# Patient Record
Sex: MALE | Race: WHITE
[De-identification: names, ages, dates, MRNs, and addresses within clinical notes are randomized per-mention and may not be internally consistent; named-entity substitution may affect disease eponyms.]

---

## 2019-06-09 ENCOUNTER — HOSPITAL ENCOUNTER (EMERGENCY)
Dept: HOSPITAL 41 - JD.ED | Age: 49
Discharge: HOME | End: 2019-06-09
Payer: COMMERCIAL

## 2019-06-09 DIAGNOSIS — Z88.8: ICD-10-CM

## 2019-06-09 DIAGNOSIS — Z79.899: ICD-10-CM

## 2019-06-09 DIAGNOSIS — I10: ICD-10-CM

## 2019-06-09 DIAGNOSIS — E78.00: ICD-10-CM

## 2019-06-09 DIAGNOSIS — K21.9: ICD-10-CM

## 2019-06-09 DIAGNOSIS — H10.9: Primary | ICD-10-CM

## 2019-06-09 DIAGNOSIS — B96.89: ICD-10-CM

## 2019-06-09 NOTE — EDM.PDOC
ED HPI GENERAL MEDICAL PROBLEM





- General


Chief Complaint: Eye Problems


Stated Complaint: EYE IRRITATION,POSS SOMETHING IN EYE


Time Seen by Provider: 06/09/19 13:00


Source of Information: Reports: Patient, Family


History Limitations: Reports: No Limitations





- History of Present Illness


INITIAL COMMENTS - FREE TEXT/NARRATIVE: 


48-year-old male presents to the ED with a diffusely erythematous left 

conjunctiva for the last week. States his daughter had pinkeye about 3 weeks 

ago and got better after 4 days of they believe Cipro ophthalmic drops. He's 

been using these the last 2 days without any relief. The eye is crusted and 

stuck shut in the morning and he has to wipe away mucus several times during 

the day as it blurs his visual field in the left eye. He feels a foreign body 

sensation in the eye particularly up underneath his left eyelid. Symptoms 

started about a week ago. No associated upper respiratory tract infection. 

States the eyes not excessively tearful. Does not excessively photosensitive to 

light but is mildly so. He ascribes the discomfort as a bit of a burning 

discomfort versus an itch.





Onset: Gradual


Onset Date: 06/02/19


Duration: Day(s):, Constant, Getting Worse


Location: Reports: Face


Quality: Reports: Ache (Left, high involving the conjunctiva.), Burning, Other (

Diffuse erythema with exudate right eye for a week)


Severity: Moderate


Improves with: Reports: None


Worsens with: Reports: None


Context: Reports: Sick Contact (Possibly. His daughter had pinkeye 3 weeks ago.)

.  Denies: Activity, Exercise, Lifting, Trauma, Other


Associated Symptoms: Denies: No Other Symptoms, Confusion, Chest Pain, Cough, 

cough w sputum, Diaphoresis, Fever/Chills, Headaches, Loss of Appetite, Malaise

, Nausea/Vomiting, Shortness of Breath, Syncope


Treatments PTA: Reports: Other (see below) (None.)





- Related Data


 Allergies











Allergy/AdvReac Type Severity Reaction Status Date / Time


 


ragweed pollen Allergy  Cannot Verified 06/09/19 11:44





   Remember  











Home Meds: 


 Home Meds





Gentamicin [Garamycin 0.3% Ophth Soln] 5 ml EYEBOTH TID #1 bottle 06/09/19 [Rx]


Lisinopril 20 mg PO DAILY 06/09/19 [History]


Meloxicam 15 mg PO DAILY 06/09/19 [History]


Metoprolol Tartrate 50 mg PO DAILY 06/09/19 [History]


Metoprolol Tartrate 100 mg PO BEDTIME 06/09/19 [History]


Omeprazole 40 mg PO DAILY 06/09/19 [History]











Past Medical History


Cardiovascular History: Reports: High Cholesterol, Hypertension


Gastrointestinal History: Reports: GERD


Musculoskeletal History: Reports: Arthritis





Social & Family History





- Tobacco Use


Smoking Status *Q: Never Smoker





- Caffeine Use


Caffeine Use: Reports: Coffee, Soda





- Recreational Drug Use


Recreational Drug Use: No





- Living Situation & Occupation


Living situation: Reports: 


Occupation: Employed





ED ROS ENT





- Review of Systems


Review Of Systems: See Below


Constitutional: Denies: Fever, Chills, Malaise, Weakness, Fatigue, Decreased 

Appetite, Weight Loss


HEENT: Reports: Eye Discharge (Left), Eye Pain.  Denies: Contact Lenses, Glasses


Respiratory: Reports: No Symptoms ( left for the last week)


Cardiovascular: Reports: No Symptoms


Endocrine: Reports: No Symptoms


GI/Abdominal: Reports: No Symptoms


: Reports: No Symptoms


Musculoskeletal: Reports: Other


Skin: Reports: No Symptoms (Has arthritis.)


Neurological: Reports: No Symptoms


Psychiatric: Reports: No Symptoms





ED EXAM, ENT





- Physical Exam


Exam: See Below


Exam Limited By: No Limitations


General Appearance: Alert, WD/WN, No Apparent Distress, Other (Left eye is 

obviously very erythematous i.e. the conjunctivae severely injected.)


Eye Exam: Left Eye: Conjunctival Injection (Severe. Nothing on the right side), 

Normal Fundi, Bilateral Eye: Corneal Abrasion (Slit-lamp examination reveals no 

corneal abrasion on the left side), Periorbital Changes (No periorbital changes.

), PERRL, Other (Does have some mucopurulent discharge medial canthus of the 

eye yellowish in color.)


Ears: Normal TMs


Nose: Normal Inspection


Mouth/Throat: Normal Inspection, Normal Gums, Normal Teeth


Head: Atraumatic, Normocephalic





Course





- Vital Signs


Last Recorded V/S: 


 Last Vital Signs











Temp      


 


Pulse  71   06/09/19 11:36


 


Resp  18   06/09/19 11:36


 


BP  164/96 H  06/09/19 11:36


 


Pulse Ox  99   06/09/19 11:36














- Radiology Interpretation


Free Text/Narrative:: 


48-year-old male presents to the ED with a diffusely erythematous left eye for 

the last week. States he feels like there is something up underneath his left 

upper eyelid i.e. foreign body sensation but can't recollect getting anything 

in his eye. His daughter had pinkeye and improved with 4 days of op thalamic 

drops about 3 weeks ago. He's been using these drops which we believe are Cipro 

for the last 2 days without any improvement. He states the eye is usually 

mucopurulent and crusted shut in the mornings when he awakens. It is not 

excessively tearful but is mildly sensitive to light. It's more of the pressure 

up under the eyelid that bothers him the most. I did invert the upper eyelid 

and no foreign bodies were identified but he has a cobblestoning appearance of 

the upper eyelid due to inflammation. Similarly slit lamp exam revealed a 

normal cornea with known dendritic changes or foreign bodies identified. 

Appears to be a conjunctivitis. It's unclear whether or not this could be 

adenovirus infection although is usually spreads to the other eye fairly 

quickly. Plan I'm going to place the patient on Garamycin ophthalmic drops 2 

drops to the left eye 4 times daily for the next 4 days. If not markedly 

improved he requires follow-up with an ophthalmologist or optometrist. More or 

less clarify that his infection is indeed viral in origin. There is clinically 

no evidence of glaucoma.








Departure





- Departure


Time of Disposition: 13:10


Disposition: Home, Self-Care 01


Condition: Fair


Clinical Impression: 


 Bacterial conjunctivitis of left eye








- Discharge Information


*PRESCRIPTION DRUG MONITORING PROGRAM REVIEWED*: Not Applicable


*COPY OF PRESCRIPTION DRUG MONITORING REPORT IN PATIENT JONATHAN: Not Applicable


Prescriptions: 


Gentamicin [Garamycin 0.3% Ophth Soln] 5 ml EYEBOTH TID #1 bottle


Instructions:  Bacterial Conjunctivitis, Easy-to-Read


Referrals: 


Asia Walters PA-C [Primary Care Provider] - 


Forms:  ED Department Discharge


Additional Instructions: 


Evaluation in the emergency room today in regards to red eye on the left side 

for the last week. Excessive discharge with iron added in the mornings. 

Daughter had pinkeye 3 weeks ago. Examination with slit lamp shows no corneal 

abrasions and no foreign bodies in the eye. Appears to be a infection either 

from viral or bacterial origin. Since her so much debris and discharge from the 

eye I would be suspicious that it is bacterial in origin. Suggest treatment to 

be Garamycin antibiotic eyedrops--2 drops to the left eye 4 times daily for the 

next 4 days to clear up infection. If not better then follow-up is indicated 

with one of the local optometrist. The adenovirus infection which can last up 

to 3 weeks however usually affects both eyes.

## 2019-06-26 ENCOUNTER — HOSPITAL ENCOUNTER (OUTPATIENT)
Dept: HOSPITAL 41 - JD.SDS | Age: 49
Discharge: HOME | End: 2019-06-26
Attending: SURGERY
Payer: COMMERCIAL

## 2019-06-26 DIAGNOSIS — B96.81: ICD-10-CM

## 2019-06-26 DIAGNOSIS — K21.0: ICD-10-CM

## 2019-06-26 DIAGNOSIS — E66.9: ICD-10-CM

## 2019-06-26 DIAGNOSIS — I10: ICD-10-CM

## 2019-06-26 DIAGNOSIS — K22.4: ICD-10-CM

## 2019-06-26 DIAGNOSIS — K52.9: ICD-10-CM

## 2019-06-26 DIAGNOSIS — M19.90: ICD-10-CM

## 2019-06-26 DIAGNOSIS — Z79.899: ICD-10-CM

## 2019-06-26 DIAGNOSIS — K29.50: ICD-10-CM

## 2019-06-26 DIAGNOSIS — Z12.11: Primary | ICD-10-CM

## 2019-06-26 DIAGNOSIS — R06.83: ICD-10-CM

## 2019-06-26 DIAGNOSIS — D12.4: ICD-10-CM

## 2019-06-26 DIAGNOSIS — E78.00: ICD-10-CM

## 2019-06-26 DIAGNOSIS — G43.909: ICD-10-CM

## 2019-06-26 DIAGNOSIS — D12.0: ICD-10-CM

## 2019-06-26 NOTE — PCM.PREANE
Preanesthetic Assessment





- Procedure


Proposed Procedure: 





diag egd


screen colon





- Anesthesia/Transfusion/Family Hx


Anesthesia History: Prior Anesthesia Without Reaction


Family History of Anesthesia Reaction: No


Transfusion History: Prior Transfusion Without Reaction





- Review of Systems


General: No Symptoms


Pulmonary: No Symptoms


Cardiovascular: No Symptoms


Gastrointestinal: No Symptoms


Neurological: No Symptoms


Other: Reports: None





- Physical Assessment


NPO Status Date: 06/26/19 (0400)


NPO Status Time: 04:00


Pulse: 78


O2 Sat by Pulse Oximetry: 95


Respiratory Rate: 16


Blood Pressure: 134/90


Temperature: 97.7 F


Height: 5 ft 6 in


Weight: 103.192 kg


ASA Class: 2


Mental Status: Alert & Oriented x3


Airway Class: Mallampati = 1


Dentition: Reports: Normal Dentition


Thyro-Mental Finger Breadths: 3


Mouth Opening Finger Breadths: 3


ROM/Head Extension: Full


Lungs: Clear to Auscultation, Normal Respiratory Effort


Cardiovascular: Regular Rate, Regular Rhythm





- Allergies


Allergies/Adverse Reactions: 


 Allergies











Allergy/AdvReac Type Severity Reaction Status Date / Time


 


No Known Allergies Allergy   Verified 06/25/19 14:13














- Blood


Blood Available: No





- Anesthesia Plan


Beta Blocker: Metoprolol


Med Last Dose Date: 06/24/19


Med Last Dose Time: 21:00





- Acknowledgements


Anesthesia Type Planned: MAC


Pt an Appropriate Candidate for the Planned Anesthesia: Yes


Alternatives and Risks of Anesthesia Discussed w Pt/Guardian: Yes


Pt/Guardian Understands and Agrees with Anesthesia Plan: Yes





PreAnesthesia Questionnaire


HEENT History: Reports: Impaired Vision


Cardiovascular History: Reports: High Cholesterol, Hypertension


Respiratory History: Reports: None


Gastrointestinal History: Reports: GERD, Other (See Below)


Other Gastrointestinal History: difficultly swallowing


Genitourinary History: Reports: None


OB/GYN History: Reports: None


Musculoskeletal History: Reports: Arthritis


Neurological History: Reports: Headaches, Chronic, Migraines


Psychiatric History: Reports: None


Endocrine/Metabolic History: Reports: None, Obesity/BMI 30+


Hematologic History: Reports: None


Immunologic History: Reports: None


Oncologic (Cancer) History: Reports: None


Other Dermatologic History: skin grafting





- Past Surgical History


Head Surgeries/Procedures: Reports: None


HEENT Surgical History: Reports: None


Cardiovascular Surgical History: Reports: None


Respiratory Surgical History: Reports: None


GI Surgical History: Reports: None


Female  Surgical History: Reports: None


Male  Surgical History: Reports: None


Endocrine Surgical History: Reports: None


Neurological Surgical History: Reports: None


Musculoskeletal Surgical History: Reports: None


Oncologic Surgical History: Reports: None


Dermatological Surgical History: Reports: Skin Graft





- SUBSTANCE USE


Smoking Status *Q: Never Smoker


Tobacco Use Within Last Twelve Months: No


Second Hand Smoke Exposure: No


Days Per Week of Alcohol Use: 2


Number of Drinks Per Day: 2


Total Drinks Per Week: 4


Recreational Drug Use History: No





- HOME MEDS


Home Medications: 


 Home Meds





Lisinopril 20 mg PO DAILY 06/09/19 [History]


Meloxicam 15 mg PO DAILY 06/09/19 [History]


Metoprolol Tartrate 50 mg PO QAM 06/09/19 [History]


Metoprolol Tartrate 100 mg PO BEDTIME 06/09/19 [History]


Omeprazole 40 mg PO DAILY 06/09/19 [History]











- CURRENT (IN HOUSE) MEDS


Current Meds: 





 Current Medications





Lactated Ringer's (Ringers, Lactated)  1,000 mls @ 125 mls/hr IV ASDIRECTED AMARI


   Stop: 06/26/19 23:00


Lidocaine/Sodium Bicarbonate (Buffered Lidocaine 1% In Ns 8.4%)  0.25 ml IDERM 

ONETIME PRN


   PRN Reason: Prior to IV Start


   Stop: 06/26/19 18:00


Sodium Chloride (Saline Flush)  10 ml FLUSH ASDIRECTED PRN


   PRN Reason: Keep Vein Open


   Stop: 06/26/19 18:00





Discontinued Medications





Fentanyl (Sublimaze) Confirm Administered Dose 100 mcg .ROUTE .STK-MED ONE


   Stop: 06/26/19 07:10


Lidocaine HCl (Xylocaine-Mpf 1%) Confirm Administered Dose 4 mls @ as directed 

.ROUTE .STK-MED ONE


   Stop: 06/26/19 07:09


Midazolam HCl (Versed 1 Mg/Ml) Confirm Administered Dose 2 mg .ROUTE .STK-MED 

ONE


   Stop: 06/26/19 07:10


Propofol (Diprivan  20 Ml) Confirm Administered Dose 400 mg .ROUTE .STK-MED ONE


   Stop: 06/26/19 07:10

## 2019-06-26 NOTE — [id number]
Post Anesthesia Note





- EVALUATION WITHIN 48HRS OF ANESTHETIC


Vital Signs in Normal Range: Yes


Patient Participated in Evaluation: Yes


Respiratory Function Stable: Yes


Airway Patent: Yes


Cardiovascular Function Stable: Yes


Hydration Status Stable: Yes


Pain Control Satisfactory: Yes


Nausea and Vomiting Control Satisfactory: Yes


Mental Status Recovered: Yes


Pulse Rate: 80


SaO2: 91


Resp Rate: 12


Temperature: 97.2 F


Blood Pressure: 132/79

## 2019-06-26 NOTE — OR
DATE OF OPERATION:  06/26/2019

 

SURGEON:  Papa Phillip MD

 

PREOPERATIVE DIAGNOSIS:

1. Esophageal dysmotility.

2. Gastroesophageal reflux disease.

3. Colorectal cancer screening.

 

POSTOPERATIVE DIAGNOSIS:

1. Esophageal dysmotility.

2. Gastroesophageal reflux disease.

3. Colorectal cancer screening.

4. Colon polyps.

5. Question of Lim esophagus.

 

PROCEDURE:

EGD with biopsies and colonoscopy with polypectomy x3.

 

PATHOLOGY:

1. Duodenum.

2. Antrum.

3. Body.

4. Fundus of the stomach.

5. GE junction.

6. Distal esophagus.

7. Cecal polyps x2.

8. Descending colon polyp.

 

FINDINGS:

He had some salmon-pink mucosa at the GE junction.  I also noted 3 polyps, 2 in

the cecum and 1 in the descending colon.  These were all diminutive and removed

entirely with a biopsy forceps.  I did not note diverticulosis.  He has no

evidence of hiatal hernia.

 

DISPOSITION:

Stable at the end of the procedure.

 

ESTIMATED BLOOD LOSS:

Minimal.

 

COMPLICATIONS:

None.

 

INDICATIONS:

Narciso is a 48-year-old male who has never had a colonoscopy.  He reported to my

office with complaints of esophageal dysmotility.  He is having difficulty

swallowing solids and liquids.  The patient has had a swallow study, which shows

dysmotility which has not been yet further characterized.  He also complains of

a longstanding history of gastroesophageal reflux disease.  He is booked for

screening colonoscopy and an EGD for diagnosis.  He is fully informed of the

major risks of the procedure.  These include, but are not limited to perforation

of the colon, bleeding, perforation of the GI tract, further surgery, risks of

anesthesia, aspiration, laryngeal spasm, and many others.  He gave informed

consent of what was done.

 

DESCRIPTION OF PROCEDURE:

Narciso was brought to the gastro suite and placed in a left lateral decubitus

position.  A bite block was placed.  He was given monitored anesthesia.  I

introduced the endoscope into the upper esophagus.  I advanced the scope keeping

the lumen in view at all times with gentle forward pressure.  I entered the

stomach and flexed the scope into the duodenum.  I passed the scope to the 4th

portion of the duodenum.  I biopsied the duodenum in 4 random locations.  I

found no loss of villous architecture.  There were no ulcers.  I withdrew the

scope in the antrum.  I biopsied the antrum, body, and fundus passing this for

H. pylori testing as well.  I found no gastric ulcerations.  No hiatal hernia

was identified on retroflex.  There was no apparent mucosal lesion.  I withdrew

the scope in the esophagus and biopsied an area of salmon-pink mucosa at the GE

junction.  There was a very short segment of salmon-pink mucosa.  I biopsied

circumferentially in 6 locations.  I then biopsied the distal esophagus.  I

evacuated the air from the stomach and rechecked for any bleeding, there was

none.  I then inspected the entirety of the esophagus.  There were no mucosal

lesions of the esophagus and no strictures as far as I could identify.  At the

end of this portion of the procedure, the scope was withdrawn.  At this point, I

turned my attention to the colonoscopy.

 

Digital rectal exam was performed with copious lubrication.  I introduced the

colonoscope into the rectum.  I advanced the scope with gentle forward pressure

to the cecum, keeping the lumen in view at all times.  I documented the cecum

photographically.  I found 2 diminutive polyps in the cecum.  Both of these were

removed in their entirety with a biopsy forceps.  Thorough careful inspection of

the entirety of the mucosa of the colon demonstrated an additional polyp in the

descending colon.  There was no diverticulosis.  He had an excellent bowel prep.

The exam lasted more than 6 minutes.  At the end of the procedure, the scope was

withdrawn in its entirety.  Air was evacuated on the way out.

 

PLAN:

I will see him in the office in 2 to 3 weeks to discuss results of his

pathology.  I will also arrange for a consult with his gastroenterologist to

further characterize his esophageal dysmotility with an esophageal manometry

study.

 

OPERATION PERFORMED:

 

ANESTHESIA:

 

 

DD:  06/26/2019 08:41:27

DT:  06/26/2019 13:41:24  MMODAL

Job #:  [id number]

## 2020-01-25 NOTE — EDM.PDOC
ED HPI GENERAL MEDICAL PROBLEM





- General


Chief Complaint: Diabetic Complaint


Stated Complaint: HIGH BLOOD SUGAR


Time Seen by Provider: 01/25/20 13:56


Source of Information: Reports: Patient


History Limitations: Reports: No Limitations





- History of Present Illness


INITIAL COMMENTS - FREE TEXT/NARRATIVE: 





Patient is a 49-year-old male who presents with complaints of high blood 

glucose.  He states that he was seen at the clinic in [facility] yesterday for 

increased thirst, increased urination, and increased fatigue over the last 

couple months.  He states that he received a phone call from the clinic today 

stating that his blood glucose was significantly elevated and that he should 

come to the ER for evaluation and treatment.  Patient denies any recent 

abdominal pain, vomiting, or diarrhea.  He has had no recent unexplained weight 

loss.





Patient's past medical history is significant for hypertension for which she 

takes metoprolol.  He does state that he had a "slight heart attack a long time 

ago".





- Related Data


 Allergies











Allergy/AdvReac Type Severity Reaction Status Date / Time


 


No Known Allergies Allergy   Verified 01/25/20 13:41











Home Meds: 


 Home Meds





Meloxicam 15 mg PO DAILY 06/09/19 [History]


Metoprolol Tartrate 100 mg PO DAILY 06/09/19 [History]


Omeprazole 40 mg PO DAILY 06/09/19 [History]


metFORMIN HCl [Metformin HCl] 500 mg PO DAILY #30 tablet 01/25/20 [Rx]











Past Medical History


HEENT History: Reports: Impaired Vision


Cardiovascular History: Reports: High Cholesterol, Hypertension, MI


Respiratory History: Reports: None


Gastrointestinal History: Reports: GERD, Other (See Below)


Other Gastrointestinal History: difficultly swallowing


Genitourinary History: Reports: None


OB/GYN History: Reports: None


Musculoskeletal History: Reports: Arthritis


Neurological History: Reports: Headaches, Chronic, Migraines


Psychiatric History: Reports: None


Endocrine/Metabolic History: Reports: None, Obesity/BMI 30+


Hematologic History: Reports: None


Immunologic History: Reports: None


Oncologic (Cancer) History: Reports: None


Other Dermatologic History: skin grafting





- Past Surgical History


Head Surgeries/Procedures: Reports: None


HEENT Surgical History: Reports: None


Cardiovascular Surgical History: Reports: None


Respiratory Surgical History: Reports: None


GI Surgical History: Reports: None


Male  Surgical History: Reports: None


Endocrine Surgical History: Reports: None


Neurological Surgical History: Reports: None


Musculoskeletal Surgical History: Reports: None


Oncologic Surgical History: Reports: None


Dermatological Surgical History: Reports: Skin Graft





Social & Family History





- Tobacco Use


Smoking Status *Q: Never Smoker


Second Hand Smoke Exposure: No





- Caffeine Use


Caffeine Use: Reports: None





- Recreational Drug Use


Recreational Drug Use: No





- Living Situation & Occupation


Living situation: Reports: 


Occupation: Employed





ED ROS GENERAL





- Review of Systems


Review Of Systems: See Below


Constitutional: Reports: Fatigue.  Denies: Weight Loss


HEENT: Reports: No Symptoms


Respiratory: Reports: No Symptoms


Endocrine: Reports: Fatigue, High Glucose, Polydypsia, Polyuria


GI/Abdominal: Reports: No Symptoms.  Denies: Abdominal Pain, Nausea, Vomiting


: Reports: No Symptoms


Musculoskeletal: Reports: No Symptoms


Skin: Reports: No Symptoms


Neurological: Reports: No Symptoms


Psychiatric: Reports: No Symptoms


Hematologic/Lymphatic: Reports: No Symptoms


Immunologic: Reports: No Symptoms





ED EXAM GENERAL NO PERIP PULSE





- Physical Exam


Exam: See Below


Exam Limited By: No Limitations


General Appearance: Alert, WD/WN, No Apparent Distress


Respiratory/Chest: No Respiratory Distress, Lungs Clear, Normal Breath Sounds, 

No Accessory Muscle Use, Chest Non-Tender


Cardiovascular: Normal Peripheral Pulses, Regular Rate, Rhythm, No Edema, No 

Gallop, No JVD, No Murmur, No Rub


GI/Abdominal: Normal Bowel Sounds, Soft, Non-Tender, No Organomegaly, No 

Distention, No Abnormal Bruit, No Mass


Neurological: Alert, Oriented, CN II-XII Intact, Normal Cognition, Normal Gait, 

Normal Reflexes, No Motor/Sensory Deficits


Psychiatric: Normal Affect, Normal Mood


Skin Exam: Warm, Dry, Intact, Normal Color, No Rash





Course





- Vital Signs


Last Recorded V/S: 


 Last Vital Signs











Temp  98 F   01/25/20 13:38


 


Pulse  76   01/25/20 13:38


 


Resp  16   01/25/20 13:38


 


BP  179/103 H  01/25/20 13:38


 


Pulse Ox  95   01/25/20 13:38














- Orders/Labs/Meds


Orders: 


 Active Orders 24 hr











 Category Date Time Status


 


 Blood Glucose Check, Bedside [RC] ONETIME Care  01/25/20 16:00 Active


 


 Peripheral IV Care [RC] .AS DIRECTED Care  01/25/20 14:05 Active


 


 Sodium Chloride 0.9% [Normal Saline] 1,000 ml Med  01/25/20 14:45 Active





 IV ASDIRECTED   


 


 Sodium Chloride 0.9% [Saline Flush] Med  01/25/20 14:05 Active





 10 ml FLUSH ASDIRECTED PRN   


 


 Peripheral IV Insertion Adult [OM.PC] Stat Oth  01/25/20 14:04 Ordered








 Medication Orders





Sodium Chloride (Normal Saline)  1,000 mls @ 999 mls/hr IV ASDIRECTED AMARI


   Last Admin: 01/25/20 15:06  Dose: 999 mls/hr


Sodium Chloride (Saline Flush)  10 ml FLUSH ASDIRECTED PRN


   PRN Reason: Keep Vein Open


   Last Admin: 01/25/20 14:10  Dose: 10 ml








Labs: 


 Laboratory Tests











  01/25/20 01/25/20 01/25/20 Range/Units





  14:12 14:12 14:12 


 


WBC   6.99   (4.23-9.07)  K/mm3


 


RBC   4.54 L   (4.63-6.08)  M/mm3


 


Hgb   13.8   (13.7-17.5)  gm/dl


 


Hct   40.9   (40.1-51.0)  %


 


MCV   90.1   (79.0-92.2)  fl


 


MCH   30.4   (25.7-32.2)  pg


 


MCHC   33.7   (32.2-35.5)  g/dl


 


RDW Std Deviation   42.3   (35.1-43.9)  fL


 


Plt Count   165   (163-337)  K/mm3


 


MPV   11.6   (9.4-12.3)  fl


 


Neut % (Auto)   65.0   (34.0-67.9)  %


 


Lymph % (Auto)   24.0   (21.8-53.1)  %


 


Mono % (Auto)   6.9   (5.3-12.2)  %


 


Eos % (Auto)   3.0   (0.8-7.0)  


 


Baso % (Auto)   0.4   (0.1-1.2)  %


 


Neut # (Auto)   4.54   (1.78-5.38)  K/mm3


 


Lymph # (Auto)   1.68   (1.32-3.57)  K/mm3


 


Mono # (Auto)   0.48   (0.30-0.82)  K/mm3


 


Eos # (Auto)   0.21   (0.04-0.54)  K/mm3


 


Baso # (Auto)   0.03   (0.01-0.08)  K/mm3


 


Sodium  133 L    (136-145)  mEq/L


 


Potassium  3.7    (3.5-5.1)  mEq/L


 


Chloride  96 L    ()  mEq/L


 


Carbon Dioxide  23    (21-32)  mEq/L


 


Anion Gap  17.7 H    (5-15)  


 


BUN  17    (7-18)  mg/dL


 


Creatinine  1.3    (0.7-1.3)  mg/dL


 


Est Cr Clr Drug Dosing  60.66    mL/min


 


Estimated GFR (MDRD)  58    (>60)  mL/min


 


BUN/Creatinine Ratio  13.1 L    (14-18)  


 


Glucose  423 H    ()  mg/dL


 


POC Glucose     ()  mg/dL


 


Hemoglobin A1c    10.90 H  (4.50-6.20)  %


 


Calcium  8.6    (8.5-10.1)  mg/dL


 


Total Bilirubin  0.3    (0.2-1.0)  mg/dL


 


AST  18    (15-37)  U/L


 


ALT  67 H    (16-63)  U/L


 


Alkaline Phosphatase  95    ()  U/L


 


Total Protein  7.2    (6.4-8.2)  g/dl


 


Albumin  3.7    (3.4-5.0)  g/dl


 


Globulin  3.5    gm/dL


 


Albumin/Globulin Ratio  1.1    (1-2)  


 


Urine Color     (Yellow)  


 


Urine Appearance     (Clear)  


 


Urine pH     (5.0-8.0)  


 


Ur Specific Gravity     (1.005-1.030)  


 


Urine Protein     (Negative)  


 


Urine Glucose (UA)     (Negative)  


 


Urine Ketones     (Negative)  


 


Urine Occult Blood     (Negative)  


 


Urine Nitrite     (Negative)  


 


Urine Bilirubin     (Negative)  


 


Urine Urobilinogen     (0.2-1.0)  


 


Ur Leukocyte Esterase     (Negative)  


 


Urine RBC     (0-5)  /hpf


 


Urine WBC     (0-5)  /hpf


 


Ur Squamous Epith Cells     (0-5)  /hpf


 


Urine Bacteria     (FEW)  /hpf


 


Urine Mucus     (FEW)  /hpf














  01/25/20 01/25/20 Range/Units





  14:45 16:02 


 


WBC    (4.23-9.07)  K/mm3


 


RBC    (4.63-6.08)  M/mm3


 


Hgb    (13.7-17.5)  gm/dl


 


Hct    (40.1-51.0)  %


 


MCV    (79.0-92.2)  fl


 


MCH    (25.7-32.2)  pg


 


MCHC    (32.2-35.5)  g/dl


 


RDW Std Deviation    (35.1-43.9)  fL


 


Plt Count    (163-337)  K/mm3


 


MPV    (9.4-12.3)  fl


 


Neut % (Auto)    (34.0-67.9)  %


 


Lymph % (Auto)    (21.8-53.1)  %


 


Mono % (Auto)    (5.3-12.2)  %


 


Eos % (Auto)    (0.8-7.0)  


 


Baso % (Auto)    (0.1-1.2)  %


 


Neut # (Auto)    (1.78-5.38)  K/mm3


 


Lymph # (Auto)    (1.32-3.57)  K/mm3


 


Mono # (Auto)    (0.30-0.82)  K/mm3


 


Eos # (Auto)    (0.04-0.54)  K/mm3


 


Baso # (Auto)    (0.01-0.08)  K/mm3


 


Sodium    (136-145)  mEq/L


 


Potassium    (3.5-5.1)  mEq/L


 


Chloride    ()  mEq/L


 


Carbon Dioxide    (21-32)  mEq/L


 


Anion Gap    (5-15)  


 


BUN    (7-18)  mg/dL


 


Creatinine    (0.7-1.3)  mg/dL


 


Est Cr Clr Drug Dosing    mL/min


 


Estimated GFR (MDRD)    (>60)  mL/min


 


BUN/Creatinine Ratio    (14-18)  


 


Glucose    ()  mg/dL


 


POC Glucose   260 H  ()  mg/dL


 


Hemoglobin A1c    (4.50-6.20)  %


 


Calcium    (8.5-10.1)  mg/dL


 


Total Bilirubin    (0.2-1.0)  mg/dL


 


AST    (15-37)  U/L


 


ALT    (16-63)  U/L


 


Alkaline Phosphatase    ()  U/L


 


Total Protein    (6.4-8.2)  g/dl


 


Albumin    (3.4-5.0)  g/dl


 


Globulin    gm/dL


 


Albumin/Globulin Ratio    (1-2)  


 


Urine Color  Yellow   (Yellow)  


 


Urine Appearance  Clear   (Clear)  


 


Urine pH  5.5   (5.0-8.0)  


 


Ur Specific Gravity  1.020   (1.005-1.030)  


 


Urine Protein  Negative   (Negative)  


 


Urine Glucose (UA)  2+ H   (Negative)  


 


Urine Ketones  Negative   (Negative)  


 


Urine Occult Blood  Negative   (Negative)  


 


Urine Nitrite  Negative   (Negative)  


 


Urine Bilirubin  Negative   (Negative)  


 


Urine Urobilinogen  0.2   (0.2-1.0)  


 


Ur Leukocyte Esterase  Negative   (Negative)  


 


Urine RBC  0-5   (0-5)  /hpf


 


Urine WBC  0-5   (0-5)  /hpf


 


Ur Squamous Epith Cells  Not seen   (0-5)  /hpf


 


Urine Bacteria  Occasional   (FEW)  /hpf


 


Urine Mucus  Not seen   (FEW)  /hpf











Meds: 


Medications











Generic Name Dose Route Start Last Admin





  Trade Name Freq  PRN Reason Stop Dose Admin


 


Sodium Chloride  1,000 mls @ 999 mls/hr  01/25/20 14:45  01/25/20 15:06





  Normal Saline  IV   999 mls/hr





  ASDIRECTED AMARI   Administration





     





     





     





     


 


Sodium Chloride  10 ml  01/25/20 14:05  01/25/20 14:10





  Saline Flush  FLUSH   10 ml





  ASDIRECTED PRN   Administration





  Keep Vein Open   





     





     





     














Discontinued Medications














Generic Name Dose Route Start Last Admin





  Trade Name Freq  PRN Reason Stop Dose Admin


 


Insulin Human Regular  8 unit  01/25/20 14:44  01/25/20 15:04





  Humulin R  SUBCUT  01/25/20 14:45  8 unit





  ONETIME ONE   Administration





     





     





     





     














- Re-Assessments/Exams


Free Text/Narrative Re-Assessment/Exam: 





01/25/20 16:10


Patient's hematology revealed a sodium of 133, chloride 96, anion gap 17.7, 

glucose 423, and hemoglobin A1c of 10.9.  Urinalysis was negative for any 

ketones but did have 2+ glucose.  Based on these results, patient is diagnosed 

with diabetes mellitus type 2.  Patient received 1 L of IV normal saline as 

well as 8 units of subcutaneous insulin.  Bedside blood glucose one hour later 

was 260.  Patient has been started on metformin 500 mg once daily at 

dinnertime.  I discussed with him in detail that there will likely be additions 

to this treatment plan as well as lifestyle modifications and that close follow-

up with a primary care provider is essential.  He has also been provided a 

prescription for a blood glucose monitor, 90 test strips, and 90 lancets.  It 

is recommended that he check his blood glucose is 3 times daily and keep a log 

to take with him to his primary care provider.  I did also discuss with him 

that he would benefit from a consult with a dietitian and diabetic educator.  

All questions were answered at this time.  Discharge instructions as noted.











Departure





- Departure


Time of Disposition: 16:07


Disposition: Home, Self-Care 01


Condition: Fair


Clinical Impression: 


Diabetes mellitus


Qualifiers:


 Diabetes mellitus type: type 2 Diabetes mellitus long term insulin use: 

without long term use Diabetes mellitus complication status: without 

complication Qualified Code(s): E11.9 - Type 2 diabetes mellitus without 

complications








- Discharge Information


*PRESCRIPTION DRUG MONITORING PROGRAM REVIEWED*: No


*COPY OF PRESCRIPTION DRUG MONITORING REPORT IN PATIENT JONATHAN: No


Prescriptions: 


metFORMIN HCl [Metformin HCl] 500 mg PO DAILY #30 tablet


Instructions:  Type 2 Diabetes Mellitus, Diagnosis, Adult, Easy-to-Read


Referrals: 


PCP,None [Primary Care Provider] - 


Manuela Orlando NP [Ordering Only Provider] - 


Forms:  ED Department Discharge


Additional Instructions: 


You were seen in the emergency department today for an elevated blood glucose 

with symptoms of increased thirst, increased urination, and fatigue for the 

last couple months.  Your blood glucose when the ER today was 423 which was a 

nonfasting blood glucose.  Your A1c, which is an indicator of your blood sugars 

for the last 3 months, was 10.9.  An ideal A1c is less than 6.5.  There were no 

ketones in your urine and your other lab work was normal.  While in the ER,  

you received 1 L of IV fluids and 8 units of subcutaneous insulin to bring down 

this initial blood glucose.  Your blood glucose after this treatment was 260.





You will be started on metformin 500 mg once daily at suppertime.  As we 

discussed, this is just a starting point for your diabetes management.  You 

will likely be started on additional medications and/or have this dose 

increased. 





A prescription has also been written for a blood glucose monitor, test strips, 

and lancets.  I recommend that you check your blood sugar first thing in the 

morning, 2 hours after a meal, and at bedtime.  Keep a log of these readings to 

take to your primary care provider.





First thing Monday morning, I recommend that she call and schedule an 

appointment with your primary care provider.  It is also recommended that you 

limit your carb intake and partake in daily exercise.  You would benefit from a 

referral to a dietitian and diabetic educator.  You may discuss these options 

with your primary care provider.





If you experience any new or worsening symptoms of concern, please do not 

hesitate to return to the emergency department.





Sepsis Event Note





- Evaluation


Sepsis Screening Result: No Definite Risk





- Focused Exam


Vital Signs: 


 Vital Signs











  Temp Pulse Resp BP Pulse Ox


 


 01/25/20 13:38  98 F  76  16  179/103 H  95











Date Exam was Performed: 01/25/20


Time Exam was Performed: 16:28





- My Orders


Last 24 Hours: 


My Active Orders





01/25/20 14:04


Peripheral IV Insertion Adult [OM.PC] Stat 





01/25/20 14:05


Peripheral IV Care [RC] .AS DIRECTED 


Sodium Chloride 0.9% [Saline Flush]   10 ml FLUSH ASDIRECTED PRN 





01/25/20 14:45


Sodium Chloride 0.9% [Normal Saline] 1,000 ml IV ASDIRECTED 





01/25/20 16:00


Blood Glucose Check, Bedside [RC] ONETIME 














- Assessment/Plan


Last 24 Hours: 


My Active Orders





01/25/20 14:04


Peripheral IV Insertion Adult [OM.PC] Stat 





01/25/20 14:05


Peripheral IV Care [RC] .AS DIRECTED 


Sodium Chloride 0.9% [Saline Flush]   10 ml FLUSH ASDIRECTED PRN 





01/25/20 14:45


Sodium Chloride 0.9% [Normal Saline] 1,000 ml IV ASDIRECTED 





01/25/20 16:00


Blood Glucose Check, Bedside [RC] ONETIME